# Patient Record
Sex: FEMALE | Race: WHITE | NOT HISPANIC OR LATINO | URBAN - METROPOLITAN AREA
[De-identification: names, ages, dates, MRNs, and addresses within clinical notes are randomized per-mention and may not be internally consistent; named-entity substitution may affect disease eponyms.]

---

## 2021-10-31 ENCOUNTER — EMERGENCY (EMERGENCY)
Facility: HOSPITAL | Age: 36
LOS: 1 days | Discharge: HOME | End: 2021-10-31
Attending: EMERGENCY MEDICINE | Admitting: EMERGENCY MEDICINE
Payer: COMMERCIAL

## 2021-10-31 VITALS
TEMPERATURE: 99 F | HEART RATE: 98 BPM | RESPIRATION RATE: 16 BRPM | SYSTOLIC BLOOD PRESSURE: 140 MMHG | DIASTOLIC BLOOD PRESSURE: 96 MMHG | OXYGEN SATURATION: 98 %

## 2021-10-31 VITALS
HEIGHT: 68 IN | OXYGEN SATURATION: 96 % | HEART RATE: 124 BPM | WEIGHT: 145.06 LBS | RESPIRATION RATE: 18 BRPM | SYSTOLIC BLOOD PRESSURE: 165 MMHG | DIASTOLIC BLOOD PRESSURE: 107 MMHG | TEMPERATURE: 99 F

## 2021-10-31 DIAGNOSIS — M79.604 PAIN IN RIGHT LEG: ICD-10-CM

## 2021-10-31 DIAGNOSIS — M54.2 CERVICALGIA: ICD-10-CM

## 2021-10-31 DIAGNOSIS — M79.601 PAIN IN RIGHT ARM: ICD-10-CM

## 2021-10-31 DIAGNOSIS — M79.605 PAIN IN LEFT LEG: ICD-10-CM

## 2021-10-31 DIAGNOSIS — M79.602 PAIN IN LEFT ARM: ICD-10-CM

## 2021-10-31 DIAGNOSIS — R00.0 TACHYCARDIA, UNSPECIFIED: ICD-10-CM
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
## 2021-10-31 DIAGNOSIS — G43.909 MIGRAINE, UNSPECIFIED, NOT INTRACTABLE, WITHOUT STATUS MIGRAINOSUS: ICD-10-CM
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
## 2021-10-31 PROCEDURE — 70450 CT HEAD/BRAIN W/O DYE: CPT | Mod: MA

## 2021-10-31 PROCEDURE — 96360 HYDRATION IV INFUSION INIT: CPT

## 2021-10-31 PROCEDURE — 93005 ELECTROCARDIOGRAM TRACING: CPT

## 2021-10-31 PROCEDURE — 70450 CT HEAD/BRAIN W/O DYE: CPT | Mod: 26,MA

## 2021-10-31 PROCEDURE — 93010 ELECTROCARDIOGRAM REPORT: CPT

## 2021-10-31 PROCEDURE — 99284 EMERGENCY DEPT VISIT MOD MDM: CPT

## 2021-10-31 PROCEDURE — 99284 EMERGENCY DEPT VISIT MOD MDM: CPT | Mod: 25

## 2021-10-31 RX ORDER — IBUPROFEN 200 MG
600 TABLET ORAL ONCE
Refills: 0 | Status: COMPLETED | OUTPATIENT
Start: 2021-10-31 | End: 2021-10-31

## 2021-10-31 RX ORDER — SODIUM CHLORIDE 9 MG/ML
1000 INJECTION INTRAMUSCULAR; INTRAVENOUS; SUBCUTANEOUS ONCE
Refills: 0 | Status: COMPLETED | OUTPATIENT
Start: 2021-10-31 | End: 2021-10-31

## 2021-10-31 RX ADMIN — Medication 600 MILLIGRAM(S): at 19:36

## 2021-10-31 RX ADMIN — SODIUM CHLORIDE 1000 MILLILITER(S): 9 INJECTION INTRAMUSCULAR; INTRAVENOUS; SUBCUTANEOUS at 21:00

## 2021-10-31 RX ADMIN — SODIUM CHLORIDE 2000 MILLILITER(S): 9 INJECTION INTRAMUSCULAR; INTRAVENOUS; SUBCUTANEOUS at 20:21

## 2021-10-31 RX ADMIN — Medication 600 MILLIGRAM(S): at 20:21
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
## 2021-10-31 NOTE — ED PROVIDER NOTE - NSICDXPASTMEDICALHX_GEN_ALL_CORE_FT
PAST MEDICAL HISTORY:  Idiopathic small fiber sensory neuropathy     Migraines     POTS (postural orthostatic tachycardia syndrome)     Seasonal allergies

## 2021-10-31 NOTE — ED PROVIDER NOTE - PATIENT PORTAL LINK FT
You can access the FollowMyHealth Patient Portal offered by [facility] by registering at the following website: [email or web address]. By joining [employer]’s FollowMyHealth portal, you will also be able to view your health information using other applications (apps) compatible with our system.
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
## 2021-10-31 NOTE — ED PROVIDER NOTE - NSFOLLOWUPCLINICS_GEN_ALL_ED_FT
[facility]  Family Medicine  [address]  [place], [place] [address]  Phone: [phone]  Fax:   Follow Up Time: 4-6 Days
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
## 2021-10-31 NOTE — ED ADULT NURSE REASSESSMENT NOTE - NS ED NURSE REASSESS COMMENT FT1
Received patient in stretcher. AOX4. VSS.  Patient denies chest pain, pain, discomfort, shortness of breath, difficulty breathing and any form of distress not noted. Patient oriented to ED area. Plan of care discussed and verbalized understanding. All needs attended. Purposeful proactive hourly rounding in progress.
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
